# Patient Record
Sex: FEMALE | Race: BLACK OR AFRICAN AMERICAN | Employment: FULL TIME | URBAN - METROPOLITAN AREA
[De-identification: names, ages, dates, MRNs, and addresses within clinical notes are randomized per-mention and may not be internally consistent; named-entity substitution may affect disease eponyms.]

---

## 2022-01-06 ENCOUNTER — HOSPITAL ENCOUNTER (EMERGENCY)
Facility: HOSPITAL | Age: 27
Discharge: HOME | End: 2022-01-06

## 2022-01-06 VITALS
DIASTOLIC BLOOD PRESSURE: 87 MMHG | HEART RATE: 68 BPM | OXYGEN SATURATION: 100 % | TEMPERATURE: 97 F | RESPIRATION RATE: 16 BRPM | WEIGHT: 220 LBS | SYSTOLIC BLOOD PRESSURE: 143 MMHG

## 2022-01-06 DIAGNOSIS — N39.0 URINARY TRACT INFECTION WITHOUT HEMATURIA, SITE UNSPECIFIED: Primary | ICD-10-CM

## 2022-01-06 LAB
B-HCG SERPL-ACNC: <1 MIU/ML
B-HCG UR QL: NEGATIVE
BILIRUB UR QL: NEGATIVE
COLOR UR: YELLOW
GLUCOSE UR-MCNC: NEGATIVE MG/DL
HGB UR QL STRIP.AUTO: NEGATIVE
KETONES UR-MCNC: NEGATIVE MG/DL
NITRITE UR QL STRIP.AUTO: NEGATIVE
PH UR: 7 [PH] (ref 5–8)
PROT UR-MCNC: 30 MG/DL
SP GR UR STRIP: 1.03 (ref 1–1.03)
UROBILINOGEN UR STRIP-ACNC: 2

## 2022-01-06 PROCEDURE — 36415 COLL VENOUS BLD VENIPUNCTURE: CPT

## 2022-01-06 PROCEDURE — 87086 URINE CULTURE/COLONY COUNT: CPT | Performed by: NURSE PRACTITIONER

## 2022-01-06 PROCEDURE — 84702 CHORIONIC GONADOTROPIN TEST: CPT | Performed by: NURSE PRACTITIONER

## 2022-01-06 PROCEDURE — 99283 EMERGENCY DEPT VISIT LOW MDM: CPT

## 2022-01-06 PROCEDURE — 81001 URINALYSIS AUTO W/SCOPE: CPT | Performed by: NURSE PRACTITIONER

## 2022-01-06 PROCEDURE — 81025 URINE PREGNANCY TEST: CPT

## 2022-01-06 RX ORDER — CEPHALEXIN 500 MG/1
500 CAPSULE ORAL 2 TIMES DAILY
Qty: 14 CAPSULE | Refills: 0 | Status: SHIPPED | OUTPATIENT
Start: 2022-01-06 | End: 2022-01-13

## 2022-01-07 NOTE — ED PROVIDER NOTES
Patient Seen in: [facility] AND [facility] Emergency Department      History   Patient presents with:  Pregnancy Issues  Urinary Symptoms    Stated Complaint: Pregnancy issues    Subjective:   27yo/f with no chronic medical problems reports with dysuria, frequen is warm and dry. Capillary Refill: Capillary refill takes less than 2 seconds. Findings: No rash. Comments: Normal color   Neurological:      Mental Status: She is alert and oriented to person, place, and time.       GCS: GCS eye subscore is

## 2022-07-24 ENCOUNTER — APPOINTMENT (OUTPATIENT)
Dept: GENERAL RADIOLOGY | Facility: HOSPITAL | Age: 27
End: 2022-07-24
Attending: EMERGENCY MEDICINE

## 2022-07-24 ENCOUNTER — HOSPITAL ENCOUNTER (EMERGENCY)
Facility: HOSPITAL | Age: 27
Discharge: HOME | End: 2022-07-24
Attending: EMERGENCY MEDICINE

## 2022-07-24 VITALS
BODY MASS INDEX: 43.98 KG/M2 | HEART RATE: 65 BPM | WEIGHT: 224 LBS | DIASTOLIC BLOOD PRESSURE: 69 MMHG | RESPIRATION RATE: 18 BRPM | SYSTOLIC BLOOD PRESSURE: 109 MMHG | HEIGHT: 60 IN | TEMPERATURE: 97 F | OXYGEN SATURATION: 97 %

## 2022-07-24 DIAGNOSIS — R10.10 PAIN OF UPPER ABDOMEN: Primary | ICD-10-CM

## 2022-07-24 DIAGNOSIS — R55 SYNCOPE, VASOVAGAL: ICD-10-CM

## 2022-07-24 DIAGNOSIS — R06.00 DYSPNEA, UNSPECIFIED TYPE: ICD-10-CM

## 2022-07-24 LAB
ALBUMIN SERPL-MCNC: 3.6 G/DL (ref 3.4–5)
ALP LIVER SERPL-CCNC: 105 U/L
ALT SERPL-CCNC: 28 U/L
ANION GAP SERPL CALC-SCNC: 5 MMOL/L (ref 0–18)
AST SERPL-CCNC: 21 U/L (ref 15–37)
BASOPHILS # BLD AUTO: 0.03 X10(3) UL (ref 0–0.2)
BASOPHILS NFR BLD AUTO: 0.4 %
BILIRUB DIRECT SERPL-MCNC: <0.1 MG/DL (ref 0–0.2)
BILIRUB SERPL-MCNC: 0.4 MG/DL (ref 0.1–2)
BUN BLD-MCNC: 9 MG/DL (ref 7–18)
BUN/CREAT SERPL: 9.9 (ref 10–20)
CALCIUM BLD-MCNC: 9.4 MG/DL (ref 8.5–10.1)
CHLORIDE SERPL-SCNC: 107 MMOL/L (ref 98–112)
CO2 SERPL-SCNC: 27 MMOL/L (ref 21–32)
CREAT BLD-MCNC: 0.91 MG/DL
D DIMER PPP FEU-MCNC: 0.3 UG/ML FEU (ref ?–0.5)
DEPRECATED RDW RBC AUTO: 41.1 FL (ref 35.1–46.3)
EOSINOPHIL # BLD AUTO: 0.13 X10(3) UL (ref 0–0.7)
EOSINOPHIL NFR BLD AUTO: 1.6 %
ERYTHROCYTE [DISTWIDTH] IN BLOOD BY AUTOMATED COUNT: 13.3 % (ref 11–15)
GLUCOSE BLD-MCNC: 84 MG/DL (ref 70–99)
HCT VFR BLD AUTO: 42.2 %
HGB BLD-MCNC: 13 G/DL
IMM GRANULOCYTES # BLD AUTO: 0.02 X10(3) UL (ref 0–1)
IMM GRANULOCYTES NFR BLD: 0.3 %
LYMPHOCYTES # BLD AUTO: 2.42 X10(3) UL (ref 1–4)
LYMPHOCYTES NFR BLD AUTO: 30.6 %
MCH RBC QN AUTO: 26.1 PG (ref 26–34)
MCHC RBC AUTO-ENTMCNC: 30.8 G/DL (ref 31–37)
MCV RBC AUTO: 84.6 FL
MONOCYTES # BLD AUTO: 0.68 X10(3) UL (ref 0.1–1)
MONOCYTES NFR BLD AUTO: 8.6 %
NEUTROPHILS # BLD AUTO: 4.64 X10 (3) UL (ref 1.5–7.7)
NEUTROPHILS # BLD AUTO: 4.64 X10(3) UL (ref 1.5–7.7)
NEUTROPHILS NFR BLD AUTO: 58.5 %
OSMOLALITY SERPL CALC.SUM OF ELEC: 286 MOSM/KG (ref 275–295)
PLATELET # BLD AUTO: 387 10(3)UL (ref 150–450)
POTASSIUM SERPL-SCNC: 4 MMOL/L (ref 3.5–5.1)
PROT SERPL-MCNC: 7.9 G/DL (ref 6.4–8.2)
RBC # BLD AUTO: 4.99 X10(6)UL
SARS-COV-2 RNA RESP QL NAA+PROBE: NOT DETECTED
SODIUM SERPL-SCNC: 139 MMOL/L (ref 136–145)
TROPONIN I HIGH SENSITIVITY: 5 NG/L
WBC # BLD AUTO: 7.9 X10(3) UL (ref 4–11)

## 2022-07-24 PROCEDURE — 80048 BASIC METABOLIC PNL TOTAL CA: CPT | Performed by: EMERGENCY MEDICINE

## 2022-07-24 PROCEDURE — 93005 ELECTROCARDIOGRAM TRACING: CPT

## 2022-07-24 PROCEDURE — 96360 HYDRATION IV INFUSION INIT: CPT

## 2022-07-24 PROCEDURE — 84484 ASSAY OF TROPONIN QUANT: CPT | Performed by: EMERGENCY MEDICINE

## 2022-07-24 PROCEDURE — 85025 COMPLETE CBC W/AUTO DIFF WBC: CPT | Performed by: EMERGENCY MEDICINE

## 2022-07-24 PROCEDURE — 80076 HEPATIC FUNCTION PANEL: CPT | Performed by: EMERGENCY MEDICINE

## 2022-07-24 PROCEDURE — 93010 ELECTROCARDIOGRAM REPORT: CPT | Performed by: EMERGENCY MEDICINE

## 2022-07-24 PROCEDURE — 99284 EMERGENCY DEPT VISIT MOD MDM: CPT

## 2022-07-24 PROCEDURE — 96361 HYDRATE IV INFUSION ADD-ON: CPT

## 2022-07-24 PROCEDURE — 99285 EMERGENCY DEPT VISIT HI MDM: CPT

## 2022-07-24 PROCEDURE — 85379 FIBRIN DEGRADATION QUANT: CPT | Performed by: EMERGENCY MEDICINE

## 2022-07-24 PROCEDURE — 71045 X-RAY EXAM CHEST 1 VIEW: CPT | Performed by: EMERGENCY MEDICINE

## 2022-07-24 RX ORDER — OMEPRAZOLE 40 MG/1
40 CAPSULE, DELAYED RELEASE ORAL DAILY
Qty: 30 CAPSULE | Refills: 0 | Status: SHIPPED | OUTPATIENT
Start: 2022-07-24 | End: 2022-08-23

## 2022-07-24 NOTE — ED INITIAL ASSESSMENT (HPI)
Pt reports \" I keep stop breathing during day\". She states she stopped breathing before she had the syncopal episode. Pt states she does it in her sleep as well. She states she has been noticing it for the past 2 weeks. Pt in no sign of respiratory distress at this time.  Pt also reports a burning sensation in her stomach

## 2022-07-24 NOTE — ED QUICK NOTES
Patient A&OX4, pt denies dizziness/lightheadedness, cp, sob, n/v. Discharge paperwork, prescription,and follow-up discussed with pt, pt verbally understands them. Pt discharged ambulatory with steady gait - no distress noted.